# Patient Record
Sex: MALE | Race: WHITE | NOT HISPANIC OR LATINO
[De-identification: names, ages, dates, MRNs, and addresses within clinical notes are randomized per-mention and may not be internally consistent; named-entity substitution may affect disease eponyms.]

---

## 2017-04-18 ENCOUNTER — MEDICATION RENEWAL (OUTPATIENT)
Age: 82
End: 2017-04-18

## 2017-05-03 ENCOUNTER — MEDICATION RENEWAL (OUTPATIENT)
Age: 82
End: 2017-05-03

## 2017-10-09 ENCOUNTER — MEDICATION RENEWAL (OUTPATIENT)
Age: 82
End: 2017-10-09

## 2017-10-10 ENCOUNTER — MEDICATION RENEWAL (OUTPATIENT)
Age: 82
End: 2017-10-10

## 2017-11-08 ENCOUNTER — APPOINTMENT (OUTPATIENT)
Dept: UROLOGY | Facility: CLINIC | Age: 82
End: 2017-11-08
Payer: MEDICARE

## 2017-11-08 DIAGNOSIS — Z00.00 ENCOUNTER FOR GENERAL ADULT MEDICAL EXAMINATION W/OUT ABNORMAL FINDINGS: ICD-10-CM

## 2017-11-08 PROCEDURE — 99213 OFFICE O/P EST LOW 20 MIN: CPT

## 2017-11-09 LAB
APPEARANCE: CLEAR
BACTERIA: NEGATIVE
BILIRUBIN URINE: NEGATIVE
BLOOD URINE: NEGATIVE
COLOR: YELLOW
CORE LAB FLUID CYTOLOGY: NORMAL
GLUCOSE QUALITATIVE U: NEGATIVE MG/DL
KETONES URINE: NEGATIVE
LEUKOCYTE ESTERASE URINE: NEGATIVE
MICROSCOPIC-UA: NORMAL
NITRITE URINE: NEGATIVE
PH URINE: 7.5
PROTEIN URINE: NEGATIVE MG/DL
RED BLOOD CELLS URINE: 0 /HPF
SPECIFIC GRAVITY URINE: 1.01
SQUAMOUS EPITHELIAL CELLS: 0 /HPF
UROBILINOGEN URINE: NEGATIVE MG/DL
WHITE BLOOD CELLS URINE: 0 /HPF

## 2018-04-09 ENCOUNTER — RX RENEWAL (OUTPATIENT)
Age: 83
End: 2018-04-09

## 2018-10-28 ENCOUNTER — RX RENEWAL (OUTPATIENT)
Age: 83
End: 2018-10-28

## 2018-11-08 ENCOUNTER — APPOINTMENT (OUTPATIENT)
Dept: UROLOGY | Facility: CLINIC | Age: 83
End: 2018-11-08
Payer: MEDICARE

## 2018-11-08 LAB
APPEARANCE: CLEAR
BACTERIA: NEGATIVE
BILIRUBIN URINE: NEGATIVE
BLOOD URINE: NEGATIVE
COLOR: YELLOW
GLUCOSE QUALITATIVE U: NEGATIVE MG/DL
KETONES URINE: NEGATIVE
LEUKOCYTE ESTERASE URINE: NEGATIVE
MICROSCOPIC-UA: NORMAL
NITRITE URINE: NEGATIVE
PH URINE: 6.5
PROTEIN URINE: NEGATIVE MG/DL
RED BLOOD CELLS URINE: 0 /HPF
SPECIFIC GRAVITY URINE: 1.01
SQUAMOUS EPITHELIAL CELLS: 0 /HPF
UROBILINOGEN URINE: NEGATIVE MG/DL
WHITE BLOOD CELLS URINE: 0 /HPF

## 2018-11-08 PROCEDURE — 99213 OFFICE O/P EST LOW 20 MIN: CPT

## 2018-11-09 LAB — CORE LAB FLUID CYTOLOGY: NORMAL

## 2019-01-20 ENCOUNTER — RX RENEWAL (OUTPATIENT)
Age: 84
End: 2019-01-20

## 2019-04-17 ENCOUNTER — FORM ENCOUNTER (OUTPATIENT)
Age: 84
End: 2019-04-17

## 2019-04-18 ENCOUNTER — OUTPATIENT (OUTPATIENT)
Dept: OUTPATIENT SERVICES | Facility: HOSPITAL | Age: 84
LOS: 1 days | End: 2019-04-18
Payer: MEDICARE

## 2019-04-18 ENCOUNTER — APPOINTMENT (OUTPATIENT)
Dept: RADIOLOGY | Facility: IMAGING CENTER | Age: 84
End: 2019-04-18
Payer: MEDICARE

## 2019-04-18 DIAGNOSIS — Z00.8 ENCOUNTER FOR OTHER GENERAL EXAMINATION: ICD-10-CM

## 2019-04-18 PROCEDURE — 71046 X-RAY EXAM CHEST 2 VIEWS: CPT | Mod: 26

## 2019-04-18 PROCEDURE — 71046 X-RAY EXAM CHEST 2 VIEWS: CPT

## 2019-04-29 ENCOUNTER — RX RENEWAL (OUTPATIENT)
Age: 84
End: 2019-04-29

## 2019-09-18 ENCOUNTER — RX RENEWAL (OUTPATIENT)
Age: 84
End: 2019-09-18

## 2019-11-06 ENCOUNTER — RX RENEWAL (OUTPATIENT)
Age: 84
End: 2019-11-06

## 2019-11-13 ENCOUNTER — APPOINTMENT (OUTPATIENT)
Dept: UROLOGY | Facility: CLINIC | Age: 84
End: 2019-11-13
Payer: MEDICARE

## 2019-11-13 DIAGNOSIS — N40.1 BENIGN PROSTATIC HYPERPLASIA WITH LOWER URINARY TRACT SYMPMS: ICD-10-CM

## 2019-11-13 DIAGNOSIS — N13.8 BENIGN PROSTATIC HYPERPLASIA WITH LOWER URINARY TRACT SYMPMS: ICD-10-CM

## 2019-11-13 DIAGNOSIS — R35.0 FREQUENCY OF MICTURITION: ICD-10-CM

## 2019-11-13 PROCEDURE — 99214 OFFICE O/P EST MOD 30 MIN: CPT

## 2019-11-13 NOTE — PHYSICAL EXAM
[General Appearance - Well Developed] : well developed [Normal Appearance] : normal appearance [General Appearance - Well Nourished] : well nourished [Well Groomed] : well groomed [General Appearance - In No Acute Distress] : no acute distress [Abdomen Soft] : soft [Abdomen Tenderness] : non-tender [Costovertebral Angle Tenderness] : no ~M costovertebral angle tenderness [Urethral Meatus] : meatus normal [Urinary Bladder Findings] : the bladder was normal on palpation [No Prostate Nodules] : no prostate nodules [Testes Mass (___cm)] : there were no testicular masses [Scrotum] : the scrotum was normal [] : no respiratory distress [Edema] : no peripheral edema [Respiration, Rhythm And Depth] : normal respiratory rhythm and effort [Oriented To Time, Place, And Person] : oriented to person, place, and time [Exaggerated Use Of Accessory Muscles For Inspiration] : no accessory muscle use [Normal Station and Gait] : the gait and station were normal for the patient's age [Affect] : the affect was normal [Mood] : the mood was normal [Not Anxious] : not anxious [No Focal Deficits] : no focal deficits [No Palpable Adenopathy] : no palpable adenopathy

## 2019-11-14 LAB
APPEARANCE: CLEAR
BACTERIA: NEGATIVE
BILIRUBIN URINE: NEGATIVE
BLOOD URINE: NEGATIVE
COLOR: YELLOW
GLUCOSE QUALITATIVE U: NORMAL
HYALINE CASTS: 0 /LPF
KETONES URINE: NEGATIVE
LEUKOCYTE ESTERASE URINE: NEGATIVE
MICROSCOPIC-UA: NORMAL
NITRITE URINE: NEGATIVE
PH URINE: 6
PROTEIN URINE: NORMAL
RED BLOOD CELLS URINE: 1 /HPF
SPECIFIC GRAVITY URINE: 1.02
SQUAMOUS EPITHELIAL CELLS: 0 /HPF
UROBILINOGEN URINE: NORMAL
WHITE BLOOD CELLS URINE: 1 /HPF

## 2020-02-22 ENCOUNTER — RX RENEWAL (OUTPATIENT)
Age: 85
End: 2020-02-22

## 2020-05-30 ENCOUNTER — RX RENEWAL (OUTPATIENT)
Age: 85
End: 2020-05-30

## 2020-10-29 ENCOUNTER — APPOINTMENT (OUTPATIENT)
Dept: UROLOGY | Facility: CLINIC | Age: 85
End: 2020-10-29

## 2021-03-25 ENCOUNTER — APPOINTMENT (OUTPATIENT)
Dept: SURGERY | Facility: CLINIC | Age: 86
End: 2021-03-25
Payer: MEDICARE

## 2021-03-25 VITALS
HEART RATE: 79 BPM | BODY MASS INDEX: 28.61 KG/M2 | RESPIRATION RATE: 16 BRPM | SYSTOLIC BLOOD PRESSURE: 152 MMHG | TEMPERATURE: 98.1 F | OXYGEN SATURATION: 98 % | WEIGHT: 178 LBS | DIASTOLIC BLOOD PRESSURE: 77 MMHG | HEIGHT: 66 IN

## 2021-03-25 DIAGNOSIS — Z78.9 OTHER SPECIFIED HEALTH STATUS: ICD-10-CM

## 2021-03-25 DIAGNOSIS — Z90.49 ACQUIRED ABSENCE OF OTHER SPECIFIED PARTS OF DIGESTIVE TRACT: ICD-10-CM

## 2021-03-25 PROCEDURE — 99204 OFFICE O/P NEW MOD 45 MIN: CPT

## 2021-03-25 RX ORDER — ALLOPURINOL 500 MG/25ML
INJECTION, POWDER, LYOPHILIZED, FOR SOLUTION INTRAVENOUS
Refills: 0 | Status: ACTIVE | COMMUNITY

## 2021-03-25 RX ORDER — BISOPROLOL FUMARATE AND HYDROCHLOROTHIAZIDE 5; 6.25 MG/1; MG/1
5-6.25 TABLET, FILM COATED ORAL
Refills: 0 | Status: ACTIVE | COMMUNITY

## 2021-03-25 NOTE — PHYSICAL EXAM
[No Rash or Lesion] : No rash or lesion [Calm] : calm [de-identified] : No acute distress [de-identified] : Sclera clear [de-identified] : Supple [de-identified] : Obese soft and nontender.  There is a reducible right inguinal hernia.  No hernias are palpated on the left [de-identified] : No clubbing cyanosis or edema [de-identified] : Cranial nerves II through XII grossly intact

## 2021-03-25 NOTE — ASSESSMENT
[FreeTextEntry1] : This patient is suffering from a right inguinal hernia.  I have offered him repair of this fascial defect.  The procedure as well as risks(including, but not limited to bleeding, infection, injury to hollow viscus, chronic pain, testicular atrophy), benefits (pain relief), and alternatives were explained to the patient.

## 2021-03-25 NOTE — CONSULT LETTER
[Dear  ___] : Dear  [unfilled], [Consult Letter:] : I had the pleasure of evaluating your patient, [unfilled]. [Please see my note below.] : Please see my note below. [Consult Closing:] : Thank you very much for allowing me to participate in the care of this patient.  If you have any questions, please do not hesitate to contact me. [Sincerely,] : Sincerely, [FreeTextEntry3] : Dhiraj Rosales MD, FACS\par [facility]\par [facility]\par [address]\par [place]  [address]\par Tel: [phone]\par Email: [email or web address] \par

## 2021-03-25 NOTE — HISTORY OF PRESENT ILLNESS
[de-identified] : This 86-year-old patient tells me that his PCP, Dr. Campoverde, found a right inguinal hernia on examination.  The patient was unaware of the presence of the hernia.  He denies any pain in the right groin or right abdomen.  He denies the presence of a visible or palpable bulge.  He denies any change in bowel or urinary habits.

## 2021-04-06 ENCOUNTER — OUTPATIENT (OUTPATIENT)
Dept: OUTPATIENT SERVICES | Facility: HOSPITAL | Age: 86
LOS: 1 days | End: 2021-04-06
Payer: MEDICARE

## 2021-04-06 VITALS
HEART RATE: 71 BPM | DIASTOLIC BLOOD PRESSURE: 72 MMHG | HEIGHT: 63.75 IN | RESPIRATION RATE: 18 BRPM | SYSTOLIC BLOOD PRESSURE: 143 MMHG | OXYGEN SATURATION: 96 % | WEIGHT: 179.46 LBS | TEMPERATURE: 97 F

## 2021-04-06 DIAGNOSIS — I10 ESSENTIAL (PRIMARY) HYPERTENSION: ICD-10-CM

## 2021-04-06 DIAGNOSIS — D69.6 THROMBOCYTOPENIA, UNSPECIFIED: ICD-10-CM

## 2021-04-06 DIAGNOSIS — Z98.890 OTHER SPECIFIED POSTPROCEDURAL STATES: Chronic | ICD-10-CM

## 2021-04-06 DIAGNOSIS — Z90.49 ACQUIRED ABSENCE OF OTHER SPECIFIED PARTS OF DIGESTIVE TRACT: Chronic | ICD-10-CM

## 2021-04-06 DIAGNOSIS — K40.90 UNILATERAL INGUINAL HERNIA, WITHOUT OBSTRUCTION OR GANGRENE, NOT SPECIFIED AS RECURRENT: ICD-10-CM

## 2021-04-06 DIAGNOSIS — N40.0 BENIGN PROSTATIC HYPERPLASIA WITHOUT LOWER URINARY TRACT SYMPTOMS: ICD-10-CM

## 2021-04-06 DIAGNOSIS — Z96.651 PRESENCE OF RIGHT ARTIFICIAL KNEE JOINT: Chronic | ICD-10-CM

## 2021-04-06 DIAGNOSIS — Z01.818 ENCOUNTER FOR OTHER PREPROCEDURAL EXAMINATION: ICD-10-CM

## 2021-04-06 LAB
HCT VFR BLD CALC: 44.4 % — SIGNIFICANT CHANGE UP (ref 39–50)
HGB BLD-MCNC: 15.5 G/DL — SIGNIFICANT CHANGE UP (ref 13–17)
MCHC RBC-ENTMCNC: 32.4 PG — SIGNIFICANT CHANGE UP (ref 27–34)
MCHC RBC-ENTMCNC: 34.9 GM/DL — SIGNIFICANT CHANGE UP (ref 32–36)
MCV RBC AUTO: 92.7 FL — SIGNIFICANT CHANGE UP (ref 80–100)
NRBC # BLD: 0 /100 WBCS — SIGNIFICANT CHANGE UP (ref 0–0)
PLATELET # BLD AUTO: 146 K/UL — LOW (ref 150–400)
RBC # BLD: 4.79 M/UL — SIGNIFICANT CHANGE UP (ref 4.2–5.8)
RBC # FLD: 12.7 % — SIGNIFICANT CHANGE UP (ref 10.3–14.5)
SARS-COV-2 RNA SPEC QL NAA+PROBE: SIGNIFICANT CHANGE UP
WBC # BLD: 7.26 K/UL — SIGNIFICANT CHANGE UP (ref 3.8–10.5)
WBC # FLD AUTO: 7.26 K/UL — SIGNIFICANT CHANGE UP (ref 3.8–10.5)

## 2021-04-06 PROCEDURE — 85027 COMPLETE CBC AUTOMATED: CPT

## 2021-04-06 PROCEDURE — 36415 COLL VENOUS BLD VENIPUNCTURE: CPT

## 2021-04-06 PROCEDURE — U0005: CPT

## 2021-04-06 PROCEDURE — U0003: CPT

## 2021-04-06 PROCEDURE — G0463: CPT

## 2021-04-06 NOTE — H&P PST ADULT - NSICDXPROBLEM_GEN_ALL_CORE_FT
PROBLEM DIAGNOSES  Problem: Hypertension  Assessment and Plan: Takes medications as prescribed     Problem: BPH (benign prostatic hyperplasia)  Assessment and Plan: Takes medications as prescribed     Problem: Thrombocytopenia  Assessment and Plan: CBC pending - Medically cleared if platlets greater than     Problem: Unilateral inguinal hernia, without obstruction or gangrene, not specified as recurrent  Assessment and Plan: Sceduled for repair right inguinal hernia with Dr Sullivan on 04/09/2021.  CBC pending.  Medical clearance, BMP, EKG on chart.  Pre op instructions given and patient verbalized understanding.  COVID-19 pending.  JERRY precautions.  Hair removal AM or procedure

## 2021-04-06 NOTE — H&P PST ADULT - NSICDXFAMILYHX_GEN_ALL_CORE_FT
FAMILY HISTORY:  Father  Still living? Unknown  FH: liver disease, Age at diagnosis: Age Unknown    Sibling  Still living? Unknown  FH: diabetes mellitus, Age at diagnosis: Age Unknown  FH: liver disease, Age at diagnosis: Age Unknown

## 2021-04-06 NOTE — H&P PST ADULT - NSANTHOSAYNRD_GEN_A_CORE
neck 16.5 inches/No. JERRY screening performed.  STOP BANG Legend: 0-2 = LOW Risk; 3-4 = INTERMEDIATE Risk; 5-8 = HIGH Risk

## 2021-04-06 NOTE — H&P PST ADULT - NSICDXPASTSURGICALHX_GEN_ALL_CORE_FT
PAST SURGICAL HISTORY:  H/O colonoscopy     S/P appendectomy 1954    Status post right partial knee replacement 2016

## 2021-04-06 NOTE — H&P PST ADULT - NSICDXPASTMEDICALHX_GEN_ALL_CORE_FT
PAST MEDICAL HISTORY:  Gout     History of BPH     Hypertension     MGUS (monoclonal gammopathy of unknown significance)     Retinal tear 2000 approx     PAST MEDICAL HISTORY:  Gout     History of BPH     Hypertension     MGUS (monoclonal gammopathy of unknown significance)     Retinal tear 2000 approx    Thrombocytopenia

## 2021-04-08 ENCOUNTER — TRANSCRIPTION ENCOUNTER (OUTPATIENT)
Age: 86
End: 2021-04-08

## 2021-04-09 ENCOUNTER — APPOINTMENT (OUTPATIENT)
Dept: SURGERY | Facility: HOSPITAL | Age: 86
End: 2021-04-09
Payer: MEDICARE

## 2021-04-09 ENCOUNTER — OUTPATIENT (OUTPATIENT)
Dept: OUTPATIENT SERVICES | Facility: HOSPITAL | Age: 86
LOS: 1 days | End: 2021-04-09
Payer: MEDICARE

## 2021-04-09 ENCOUNTER — RESULT REVIEW (OUTPATIENT)
Age: 86
End: 2021-04-09

## 2021-04-09 VITALS
SYSTOLIC BLOOD PRESSURE: 142 MMHG | TEMPERATURE: 98 F | OXYGEN SATURATION: 97 % | HEART RATE: 66 BPM | DIASTOLIC BLOOD PRESSURE: 70 MMHG | RESPIRATION RATE: 16 BRPM

## 2021-04-09 VITALS
RESPIRATION RATE: 16 BRPM | HEART RATE: 70 BPM | WEIGHT: 179.46 LBS | OXYGEN SATURATION: 97 % | DIASTOLIC BLOOD PRESSURE: 84 MMHG | TEMPERATURE: 98 F | HEIGHT: 63.75 IN | SYSTOLIC BLOOD PRESSURE: 150 MMHG

## 2021-04-09 DIAGNOSIS — Z96.651 PRESENCE OF RIGHT ARTIFICIAL KNEE JOINT: Chronic | ICD-10-CM

## 2021-04-09 DIAGNOSIS — Z98.890 OTHER SPECIFIED POSTPROCEDURAL STATES: Chronic | ICD-10-CM

## 2021-04-09 DIAGNOSIS — K40.90 UNILATERAL INGUINAL HERNIA, WITHOUT OBSTRUCTION OR GANGRENE, NOT SPECIFIED AS RECURRENT: ICD-10-CM

## 2021-04-09 DIAGNOSIS — Z90.49 ACQUIRED ABSENCE OF OTHER SPECIFIED PARTS OF DIGESTIVE TRACT: Chronic | ICD-10-CM

## 2021-04-09 PROCEDURE — 49505 PRP I/HERN INIT REDUC >5 YR: CPT | Mod: RT

## 2021-04-09 PROCEDURE — 88304 TISSUE EXAM BY PATHOLOGIST: CPT | Mod: 26

## 2021-04-09 PROCEDURE — C1781: CPT

## 2021-04-09 PROCEDURE — 88304 TISSUE EXAM BY PATHOLOGIST: CPT

## 2021-04-09 PROCEDURE — 49505 PRP I/HERN INIT REDUC >5 YR: CPT | Mod: AS,RT

## 2021-04-09 RX ORDER — ONDANSETRON 8 MG/1
4 TABLET, FILM COATED ORAL ONCE
Refills: 0 | Status: DISCONTINUED | OUTPATIENT
Start: 2021-04-09 | End: 2021-04-09

## 2021-04-09 RX ORDER — SODIUM CHLORIDE 9 MG/ML
1000 INJECTION, SOLUTION INTRAVENOUS
Refills: 0 | Status: DISCONTINUED | OUTPATIENT
Start: 2021-04-09 | End: 2021-04-09

## 2021-04-09 RX ORDER — OXYCODONE HYDROCHLORIDE 5 MG/1
5 TABLET ORAL ONCE
Refills: 0 | Status: DISCONTINUED | OUTPATIENT
Start: 2021-04-09 | End: 2021-04-09

## 2021-04-09 RX ORDER — OXYCODONE HYDROCHLORIDE 5 MG/1
1 TABLET ORAL
Qty: 12 | Refills: 0
Start: 2021-04-09 | End: 2021-04-11

## 2021-04-09 RX ORDER — HYDROMORPHONE HYDROCHLORIDE 2 MG/ML
0.5 INJECTION INTRAMUSCULAR; INTRAVENOUS; SUBCUTANEOUS
Refills: 0 | Status: DISCONTINUED | OUTPATIENT
Start: 2021-04-09 | End: 2021-04-09

## 2021-04-09 RX ADMIN — SODIUM CHLORIDE 30 MILLILITER(S): 9 INJECTION, SOLUTION INTRAVENOUS at 07:08

## 2021-04-09 NOTE — BRIEF OPERATIVE NOTE - NSICDXBRIEFPROCEDURE_GEN_ALL_CORE_FT
PROCEDURES:  Repair, hernia, inguinal, open, using mesh, adult 09-Apr-2021 10:44:05 Direct  inguinal hernia Wendy Connolly

## 2021-04-09 NOTE — ASU DISCHARGE PLAN (ADULT/PEDIATRIC) - ASU DC SPECIAL INSTRUCTIONSFT
See instruction sheet for post op instructions   ice pack to right groin for comfort   Take pain medication as directed -  take oxycodone for moderate to severe pain   take tylenol for mild pain

## 2021-04-09 NOTE — ASU DISCHARGE PLAN (ADULT/PEDIATRIC) - CARE PROVIDER_API CALL
Dhiraj Sullivan)  Surgery  [address], [address]  [place] [address]  Phone: [phone]  Fax: [phone]  Follow Up Time: 2 weeks

## 2021-04-09 NOTE — ASU PATIENT PROFILE, ADULT - PMH
Gout    History of BPH    Hypertension    MGUS (monoclonal gammopathy of unknown significance)    Retinal tear  2000 approx  Thrombocytopenia

## 2021-04-09 NOTE — ASU PATIENT PROFILE, ADULT - ABILITY TO HEAR (WITH HEARING AID OR HEARING APPLIANCE IF NORMALLY USED):
Adequate: hears normal conversation without difficulty bilateral hearing aides/Mildly to Moderately Impaired: difficulty hearing in some environments or speaker may need to increase volume or speak distinctly

## 2021-04-09 NOTE — ASU DISCHARGE PLAN (ADULT/PEDIATRIC) - CALL YOUR DOCTOR IF YOU HAVE ANY OF THE FOLLOWING:
Bleeding that does not stop/Swelling that gets worse/Pain not relieved by Medications/Fever greater than (need to indicate Fahrenheit or Celsius) Bleeding that does not stop/Swelling that gets worse/Pain not relieved by Medications/Fever greater than (need to indicate Fahrenheit or Celsius)/Nausea and vomiting that does not stop

## 2021-04-12 PROBLEM — H33.319: Chronic | Status: ACTIVE | Noted: 2021-04-06

## 2021-04-12 PROBLEM — D47.2 MONOCLONAL GAMMOPATHY: Chronic | Status: ACTIVE | Noted: 2021-04-06

## 2021-04-12 PROBLEM — I10 ESSENTIAL (PRIMARY) HYPERTENSION: Chronic | Status: ACTIVE | Noted: 2021-04-06

## 2021-04-12 PROBLEM — M10.9 GOUT, UNSPECIFIED: Chronic | Status: ACTIVE | Noted: 2021-04-06

## 2021-04-12 PROBLEM — D69.6 THROMBOCYTOPENIA, UNSPECIFIED: Chronic | Status: ACTIVE | Noted: 2021-04-06

## 2021-04-12 PROBLEM — Z87.438 PERSONAL HISTORY OF OTHER DISEASES OF MALE GENITAL ORGANS: Chronic | Status: ACTIVE | Noted: 2021-04-06

## 2021-04-15 LAB — SURGICAL PATHOLOGY STUDY: SIGNIFICANT CHANGE UP

## 2021-04-22 ENCOUNTER — APPOINTMENT (OUTPATIENT)
Dept: SURGERY | Facility: CLINIC | Age: 86
End: 2021-04-22
Payer: MEDICARE

## 2021-04-22 VITALS
TEMPERATURE: 97.1 F | RESPIRATION RATE: 18 BRPM | HEART RATE: 78 BPM | WEIGHT: 178 LBS | SYSTOLIC BLOOD PRESSURE: 157 MMHG | OXYGEN SATURATION: 98 % | BODY MASS INDEX: 28.61 KG/M2 | DIASTOLIC BLOOD PRESSURE: 78 MMHG | HEIGHT: 66 IN

## 2021-04-22 DIAGNOSIS — K40.90 UNILATERAL INGUINAL HERNIA, W/OUT OBSTRUCTION OR GANGRENE, NOT SPECIFIED AS RECURRENT: ICD-10-CM

## 2021-04-22 PROCEDURE — 99024 POSTOP FOLLOW-UP VISIT: CPT

## 2021-04-22 NOTE — REASON FOR VISIT
[Post Op: _________] : a [unfilled] post op visit [FreeTextEntry1] : Repair of right inguinal hernia with mesh.

## 2021-08-05 ENCOUNTER — RX RENEWAL (OUTPATIENT)
Age: 86
End: 2021-08-05

## 2021-10-22 ENCOUNTER — TRANSCRIPTION ENCOUNTER (OUTPATIENT)
Age: 86
End: 2021-10-22

## 2021-11-06 ENCOUNTER — TRANSCRIPTION ENCOUNTER (OUTPATIENT)
Age: 86
End: 2021-11-06

## 2021-11-06 ENCOUNTER — EMERGENCY (EMERGENCY)
Facility: HOSPITAL | Age: 86
LOS: 1 days | Discharge: HOME | End: 2021-11-06
Attending: EMERGENCY MEDICINE
Payer: MEDICARE

## 2021-11-06 VITALS
SYSTOLIC BLOOD PRESSURE: 108 MMHG | DIASTOLIC BLOOD PRESSURE: 65 MMHG | OXYGEN SATURATION: 98 % | RESPIRATION RATE: 18 BRPM | HEIGHT: 63.75 IN | WEIGHT: 182.98 LBS | HEART RATE: 84 BPM | TEMPERATURE: 98 F

## 2021-11-06 DIAGNOSIS — Z98.890 OTHER SPECIFIED POSTPROCEDURAL STATES: Chronic | ICD-10-CM

## 2021-11-06 DIAGNOSIS — Z90.49 ACQUIRED ABSENCE OF OTHER SPECIFIED PARTS OF DIGESTIVE TRACT: Chronic | ICD-10-CM

## 2021-11-06 DIAGNOSIS — Z96.651 PRESENCE OF RIGHT ARTIFICIAL KNEE JOINT: Chronic | ICD-10-CM

## 2021-11-06 LAB
ALBUMIN SERPL ELPH-MCNC: 4.5 G/DL — SIGNIFICANT CHANGE UP (ref 3.3–5)
ALP SERPL-CCNC: 65 U/L — SIGNIFICANT CHANGE UP (ref 40–120)
ALT FLD-CCNC: 22 U/L — SIGNIFICANT CHANGE UP (ref 10–45)
ANION GAP SERPL CALC-SCNC: 13 MMOL/L — SIGNIFICANT CHANGE UP (ref 5–17)
APTT BLD: 31.4 SEC — SIGNIFICANT CHANGE UP (ref 27.5–35.5)
AST SERPL-CCNC: 24 U/L — SIGNIFICANT CHANGE UP (ref 10–40)
BASE EXCESS BLDV CALC-SCNC: 1.7 MMOL/L — SIGNIFICANT CHANGE UP (ref -2–2)
BASE EXCESS BLDV CALC-SCNC: 2.2 MMOL/L — HIGH (ref -2–2)
BASOPHILS # BLD AUTO: 0.04 K/UL — SIGNIFICANT CHANGE UP (ref 0–0.2)
BASOPHILS NFR BLD AUTO: 0.5 % — SIGNIFICANT CHANGE UP (ref 0–2)
BILIRUB SERPL-MCNC: 1.5 MG/DL — HIGH (ref 0.2–1.2)
BUN SERPL-MCNC: 13 MG/DL — SIGNIFICANT CHANGE UP (ref 7–23)
CA-I SERPL-SCNC: 1.29 MMOL/L — SIGNIFICANT CHANGE UP (ref 1.15–1.33)
CA-I SERPL-SCNC: 1.32 MMOL/L — SIGNIFICANT CHANGE UP (ref 1.15–1.33)
CALCIUM SERPL-MCNC: 10.2 MG/DL — SIGNIFICANT CHANGE UP (ref 8.4–10.5)
CHLORIDE BLDV-SCNC: 100 MMOL/L — SIGNIFICANT CHANGE UP (ref 96–108)
CHLORIDE BLDV-SCNC: 102 MMOL/L — SIGNIFICANT CHANGE UP (ref 96–108)
CHLORIDE SERPL-SCNC: 99 MMOL/L — SIGNIFICANT CHANGE UP (ref 96–108)
CO2 BLDV-SCNC: 29 MMOL/L — HIGH (ref 22–26)
CO2 BLDV-SCNC: 32 MMOL/L — HIGH (ref 22–26)
CO2 SERPL-SCNC: 24 MMOL/L — SIGNIFICANT CHANGE UP (ref 22–31)
CREAT SERPL-MCNC: 0.83 MG/DL — SIGNIFICANT CHANGE UP (ref 0.5–1.3)
EOSINOPHIL # BLD AUTO: 0.1 K/UL — SIGNIFICANT CHANGE UP (ref 0–0.5)
EOSINOPHIL NFR BLD AUTO: 1.3 % — SIGNIFICANT CHANGE UP (ref 0–6)
GAS PNL BLDV: 135 MMOL/L — LOW (ref 136–145)
GAS PNL BLDV: 135 MMOL/L — LOW (ref 136–145)
GAS PNL BLDV: SIGNIFICANT CHANGE UP
GLUCOSE BLDV-MCNC: 102 MG/DL — HIGH (ref 70–99)
GLUCOSE BLDV-MCNC: 108 MG/DL — HIGH (ref 70–99)
GLUCOSE SERPL-MCNC: 104 MG/DL — HIGH (ref 70–99)
HCO3 BLDV-SCNC: 27 MMOL/L — SIGNIFICANT CHANGE UP (ref 22–29)
HCO3 BLDV-SCNC: 30 MMOL/L — HIGH (ref 22–29)
HCT VFR BLD CALC: 44 % — SIGNIFICANT CHANGE UP (ref 39–50)
HCT VFR BLDA CALC: 44 % — SIGNIFICANT CHANGE UP (ref 39–51)
HCT VFR BLDA CALC: 46 % — SIGNIFICANT CHANGE UP (ref 39–51)
HGB BLD CALC-MCNC: 14.7 G/DL — SIGNIFICANT CHANGE UP (ref 12.6–17.4)
HGB BLD CALC-MCNC: 15.3 G/DL — SIGNIFICANT CHANGE UP (ref 12.6–17.4)
HGB BLD-MCNC: 14.9 G/DL — SIGNIFICANT CHANGE UP (ref 13–17)
IMM GRANULOCYTES NFR BLD AUTO: 0.4 % — SIGNIFICANT CHANGE UP (ref 0–1.5)
INR BLD: 1.07 RATIO — SIGNIFICANT CHANGE UP (ref 0.88–1.16)
LACTATE BLDV-MCNC: 1.7 MMOL/L — SIGNIFICANT CHANGE UP (ref 0.7–2)
LACTATE BLDV-MCNC: 2.8 MMOL/L — HIGH (ref 0.7–2)
LYMPHOCYTES # BLD AUTO: 3.25 K/UL — SIGNIFICANT CHANGE UP (ref 1–3.3)
LYMPHOCYTES # BLD AUTO: 41.7 % — SIGNIFICANT CHANGE UP (ref 13–44)
MCHC RBC-ENTMCNC: 32.7 PG — SIGNIFICANT CHANGE UP (ref 27–34)
MCHC RBC-ENTMCNC: 33.9 GM/DL — SIGNIFICANT CHANGE UP (ref 32–36)
MCV RBC AUTO: 96.7 FL — SIGNIFICANT CHANGE UP (ref 80–100)
MONOCYTES # BLD AUTO: 0.55 K/UL — SIGNIFICANT CHANGE UP (ref 0–0.9)
MONOCYTES NFR BLD AUTO: 7.1 % — SIGNIFICANT CHANGE UP (ref 2–14)
NEUTROPHILS # BLD AUTO: 3.83 K/UL — SIGNIFICANT CHANGE UP (ref 1.8–7.4)
NEUTROPHILS NFR BLD AUTO: 49 % — SIGNIFICANT CHANGE UP (ref 43–77)
NRBC # BLD: 0 /100 WBCS — SIGNIFICANT CHANGE UP (ref 0–0)
PCO2 BLDV: 45 MMHG — SIGNIFICANT CHANGE UP (ref 42–55)
PCO2 BLDV: 62 MMHG — HIGH (ref 42–55)
PH BLDV: 7.3 — LOW (ref 7.32–7.43)
PH BLDV: 7.39 — SIGNIFICANT CHANGE UP (ref 7.32–7.43)
PLATELET # BLD AUTO: 143 K/UL — LOW (ref 150–400)
PO2 BLDV: 27 MMHG — SIGNIFICANT CHANGE UP (ref 25–45)
PO2 BLDV: 45 MMHG — SIGNIFICANT CHANGE UP (ref 25–45)
POTASSIUM BLDV-SCNC: 4.2 MMOL/L — SIGNIFICANT CHANGE UP (ref 3.5–5.1)
POTASSIUM BLDV-SCNC: 4.4 MMOL/L — SIGNIFICANT CHANGE UP (ref 3.5–5.1)
POTASSIUM SERPL-MCNC: 4.4 MMOL/L — SIGNIFICANT CHANGE UP (ref 3.5–5.3)
POTASSIUM SERPL-SCNC: 4.4 MMOL/L — SIGNIFICANT CHANGE UP (ref 3.5–5.3)
PROT SERPL-MCNC: 7.1 G/DL — SIGNIFICANT CHANGE UP (ref 6–8.3)
PROTHROM AB SERPL-ACNC: 12.8 SEC — SIGNIFICANT CHANGE UP (ref 10.6–13.6)
RBC # BLD: 4.55 M/UL — SIGNIFICANT CHANGE UP (ref 4.2–5.8)
RBC # FLD: 12.7 % — SIGNIFICANT CHANGE UP (ref 10.3–14.5)
SAO2 % BLDV: 40.1 % — LOW (ref 67–88)
SAO2 % BLDV: 76 % — SIGNIFICANT CHANGE UP (ref 67–88)
SARS-COV-2 RNA SPEC QL NAA+PROBE: SIGNIFICANT CHANGE UP
SODIUM SERPL-SCNC: 136 MMOL/L — SIGNIFICANT CHANGE UP (ref 135–145)
TROPONIN T, HIGH SENSITIVITY RESULT: 18 NG/L — SIGNIFICANT CHANGE UP (ref 0–51)
TROPONIN T, HIGH SENSITIVITY RESULT: 18 NG/L — SIGNIFICANT CHANGE UP (ref 0–51)
WBC # BLD: 7.8 K/UL — SIGNIFICANT CHANGE UP (ref 3.8–10.5)
WBC # FLD AUTO: 7.8 K/UL — SIGNIFICANT CHANGE UP (ref 3.8–10.5)

## 2021-11-06 PROCEDURE — 99291 CRITICAL CARE FIRST HOUR: CPT

## 2021-11-06 PROCEDURE — 70498 CT ANGIOGRAPHY NECK: CPT | Mod: 26,MA

## 2021-11-06 PROCEDURE — 70496 CT ANGIOGRAPHY HEAD: CPT | Mod: 26,MA

## 2021-11-06 PROCEDURE — 93010 ELECTROCARDIOGRAM REPORT: CPT

## 2021-11-06 RX ORDER — ALLOPURINOL 300 MG
200 TABLET ORAL DAILY
Refills: 0 | Status: DISCONTINUED | OUTPATIENT
Start: 2021-11-06 | End: 2021-11-07

## 2021-11-06 RX ORDER — ASCORBIC ACID 60 MG
1 TABLET,CHEWABLE ORAL
Qty: 0 | Refills: 0 | DISCHARGE

## 2021-11-06 RX ORDER — CHOLECALCIFEROL (VITAMIN D3) 125 MCG
1 CAPSULE ORAL
Qty: 0 | Refills: 0 | DISCHARGE

## 2021-11-06 RX ORDER — RAMIPRIL 5 MG
1 CAPSULE ORAL
Qty: 0 | Refills: 0 | DISCHARGE

## 2021-11-06 RX ORDER — SODIUM CHLORIDE 9 MG/ML
1000 INJECTION, SOLUTION INTRAVENOUS ONCE
Refills: 0 | Status: COMPLETED | OUTPATIENT
Start: 2021-11-06 | End: 2021-11-06

## 2021-11-06 RX ORDER — FINASTERIDE 5 MG/1
1 TABLET, FILM COATED ORAL
Qty: 0 | Refills: 0 | DISCHARGE

## 2021-11-06 RX ORDER — BISOPROLOL FUMARATE 10 MG/1
1 TABLET, FILM COATED ORAL
Qty: 0 | Refills: 0 | DISCHARGE

## 2021-11-06 RX ORDER — ALLOPURINOL 300 MG
2 TABLET ORAL
Qty: 0 | Refills: 0 | DISCHARGE

## 2021-11-06 RX ORDER — FINASTERIDE 5 MG/1
5 TABLET, FILM COATED ORAL AT BEDTIME
Refills: 0 | Status: DISCONTINUED | OUTPATIENT
Start: 2021-11-06 | End: 2021-11-07

## 2021-11-06 RX ADMIN — SODIUM CHLORIDE 1000 MILLILITER(S): 9 INJECTION, SOLUTION INTRAVENOUS at 18:01

## 2021-11-06 RX ADMIN — SODIUM CHLORIDE 1000 MILLILITER(S): 9 INJECTION, SOLUTION INTRAVENOUS at 19:45

## 2021-11-06 NOTE — CONSULT NOTE ADULT - ASSESSMENT
LKW:  NIHSS:    Baseline MRS:  Not a tPA candidate due to   Not a thrombectomy candidate due to absent large vessel occlusion/ or based on NIHSS    CT head showed:     Impression: Patient with     Mechanism: unknown    Recommendations: INCOMPLETE  [] HgbA1C, fasting lipid panel, CBC, CMP, coag panel, troponin  [] CDU for MRI brain w/o con,      [] telemetry to check for arrhythmia, EKG, while here  [] Neuro-checks with vitals q4h while here   [] DVT ppx if admitted    Discussed with stroke fellow Dr Milton Elmore and under supervision of attending Dr Richard Libman regarding decision against candidacy for tPA/ thrombectomy. Will be formally staffed on morning rounds with attending. Recommendations will be complete once signed by attending. Patient is a 86 y/o M with PMHx MGUS, mild thrombocytopenia, HTN and BPH, not on antiplatelet/anticoagulant agent, who presents to ED for RUE numbness that occurred intermittently throughout the day and was resolved by arrival to the ED. Neuro exam including that of RUE is unremarkable for motor or sensory findings when seen.    LKW: 11PM 11/5/21  NIHSS: 0    Baseline MRS: 0   Not a tPA candidate due to outside time window  Not a thrombectomy candidate based on NIHSS and absence of large vessel occlusion    CT head:   1.  No acute intracranial hemorrhage, transcortical infarct, loss of gray-white junction or hyperdense intravascular thrombus.  2.  No extra-axial collection, hydrocephalus, midline shift or space-occupying mass lesion    CTA head/neck: pending    Impression: Patient with one day of intermittent RUE numbness that resolved on ED presentation that can be localized to L hemispheric dysfunction. Can consider R brachial involvement as well. Given resolution of symptoms and if CT head and CTA are unremarkable, can have patient follow up outpatient with MRI head w/o contrast.     Mechanism: unknown    Recommendations:   [] HgbA1C, fasting lipid panel, CBC, CMP, coag panel, troponin  [] *If CT head and CTA head/ neck are unremarkable, can have patient follow up for MRI w/o outpatient and [address] [place] [address] for stroke neurology follow up. If both are unremarkable, consider starting aspirin 81mg and plavix 75mg (no load) and have follow up for MRI.      [] telemetry to check for arrhythmia, EKG, while here  [] Neuro-checks with vitals q4h while here   [] DVT ppx if admitted    Discussed with stroke fellow Dr Milton Elmore and under supervision of attending Dr Richard Libman regarding decision against candidacy for tPA/ thrombectomy. Will be formally staffed on morning rounds with attending. Recommendations will be complete once signed by attending. Patient is a 88 y/o M with PMHx MGUS, mild thrombocytopenia, HTN and BPH, not on antiplatelet/anticoagulant agent, who presents to ED for RUE numbness that occurred intermittently throughout the day and was resolved by arrival to the ED. Neuro exam including that of RUE is unremarkable for motor or sensory findings when seen.     LKW: 11PM 11/5/21  NIHSS: 0    Baseline MRS: 0   Not a tPA candidate due to outside time window  Not a thrombectomy candidate based on NIHSS and absence of large vessel occlusion    CT head: No acute intracranial hemorrhage, transcortical infarct, loss of gray-white junction or hyperdense intravascular thrombus. No extra-axial collection, hydrocephalus, midline shift or space-occupying mass lesion    CTA head/neck: Negative CTA of head and neck however, reversal of the usual cervical lordosis with minimal anterolisthesis of C2 on C3, C3 on C4 and C4 and C5. No acute fractures; mild chronic compression deformities of C4-C6. Mild disc osteophyte complexes at C5-C6 and C6-C7 along with moderate degenerative disc disease. Moderate to severe multilevel neural foraminal narrowing.     Impression: Patient with one day of intermittent RUE numbness that resolved on ED presentation that can be localized to L hemispheric dysfunction. Can consider nerve root compression on differential as CT of neck suggested mod/severe multilevel neural foraminal narrowing.    Recommendations:   [] HgbA1C, fasting lipid panel, CBC, CMP, coag panel, troponin  [] MRI brain w/o contrast, MRI c spine w/ w/o contrast while in CDU  [] Will determine starting aspirin 81mg and/or aspirin 81mg with plavix 75mg after obtaining MRI results  [] If signs of nerve root compression on MR c spine imaging, consider neurosurgical evaluation  [] telemetry to check for arrhythmia, EKG, while here  [] Neuro-checks with vitals q4h while here   [] DVT ppx if admitted    Discussed with stroke fellow Dr Milton Elmore and under supervision of attending Dr Richard Libman regarding decision against candidacy for tPA/ thrombectomy. Will be formally staffed on morning rounds with attending. Recommendations will be complete once signed by attending.

## 2021-11-06 NOTE — ED CDU PROVIDER INITIAL DAY NOTE - DETAILS
Numbness  -MRI  -Neuro following  -Tele/Neuro checks  -TOO [place], vitals every 4 hours, frequent reevaluations

## 2021-11-06 NOTE — ED CDU PROVIDER INITIAL DAY NOTE - PHYSICAL EXAMINATION
GEN: Well Appearing, Nontoxic, NAD  HEENT: NC/AT, Symm Facies. PERRL, EOMI  CV: No JVD/Bruits or stridor;  +S1S2, RRR w/o m/g/r  RESP: CTAB w/o w/r/r  ABD: Soft, nt/nd, +BS. No guarding/rebound.   EXT/MSK: No lower extremity edema or calf tenderness FROMx4  PSYCH: Appropriate mood and affect   Neuro: Grossly intact, AOX3 with normal speech,  Sensation grossly intact, motor 5/5 throughout

## 2021-11-06 NOTE — CONSULT NOTE ADULT - ATTENDING COMMENTS
code stroke called in ED and neurology emergently assessed patient. Briefly   88 y/o M with PMHx MGUS, mild thrombocytopenia, HTN and BPH, not on antiplatelet/anticoagulant agent, who presents to ED for RUE numbness that occurred intermittently throughout the day and was resolved by arrival to the ED     LKW: 11PM 11/5/21  NIHSS: 0    Baseline MRS: 0   Not a tPA candidate due to outside time window  Not a thrombectomy candidate based on NIHSS and absence of large vessel occlusion    CT head: No acute intracranial hemorrhage, transcortical infarct, loss of gray-white junction or hyperdense intravascular thrombus. No extra-axial collection, hydrocephalus, midline shift or space-occupying mass lesion    CTA head/neck: Negative CTA of head and neck however, reversal of the usual cervical lordosis with minimal anterolisthesis of C2 on C3, C3 on C4 and C4 and C5. No acute fractures; mild chronic compression deformities of C4-C6. Mild disc osteophyte complexes at C5-C6 and C6-C7 along with moderate degenerative disc disease. Moderate to severe multilevel neural foraminal narrowing.   LDL 76     Impression: Patient with one day of intermittent RUE numbness that resolved on ED presentation that can be localized to L hemispheric dysfunction. Can consider nerve root compression on differential as CT of neck suggested mod/severe multilevel neural foraminal narrowing.    - f/u MRI brain and C spine.   - if MRI + stroke start ASA 81 and lipitor 40 otherwise can likely f/u outpt if unreamrakble for EMG/NCS.  spoke with CDU team.     Calin Jolly MD  Vascular Neurology  Office: [phone]

## 2021-11-06 NOTE — ED ADULT NURSE REASSESSMENT NOTE - NS ED NURSE REASSESS COMMENT FT1
Pt ambulated to bathroom. Steady gait, reports no dizziness, lightheadedness, or numbness upon ambulation.

## 2021-11-06 NOTE — ED CDU PROVIDER INITIAL DAY NOTE - OBJECTIVE STATEMENT
89y/o M h/o MGUS HTN, BPH, Gout presenting with numbness in the right arm; from shoulder to hand; states noted numbness upon awakening this morning around 5AM; however, has now significantly improved and just is a little tingling in the right palm. No weakness.  No fever/chills. No nausea/vomiting. Denies chest pain, SOB. Denies fall/trauma.  Denies ASA/antiplatelet/ anticoagulant use.  ED course: CODE STROKE. CTs showed "Mild disc osteophyte complexes at C5-C6 and C6-C7 along with moderate degenerative disc disease. Moderate to severe multilevel neural foraminal narrowing." Otherwise negative for acute pathology. Evaluated by neurology. Plan for MRIs, neuro-checks, tele monitoring in CDU.

## 2021-11-06 NOTE — ED CDU PROVIDER INITIAL DAY NOTE - NS ED ROS FT
Constitutional: No fever or chills  Eyes: No visual changes, eye pain   CV: No chest pain or lower extremity edema  Resp: No SOB no cough  GI: No abd pain. No nausea or vomiting. No diarrhea.   : No dysuria, hematuria.   MSK: No musculoskeletal pain  Skin: No rash  Psych: No complaints   Neuro: No headache. + numbness +tingling. No weakness.  Endo: No DM

## 2021-11-06 NOTE — ED PROVIDER NOTE - PHYSICAL EXAMINATION
On Physical Exam:  General: well appearing, in NAD, speaking clearly in full sentences and without difficulty; cooperative with exam  HEENT: PERRL, MMM  Neck: no neck tenderness, no nuchal rigidity  Cardiac: s1s2  Lungs: CTABL  Abdomen: soft nontender/nondistended  : no bladder tenderness or distension  Skin: intact, no rash  Extremities: no peripheral edema, no gross deformities  Neuro: no gross neurologic deficits: CN II-XII grossly intact; 5/5 str in all extremities /elbow/shoulder, hips/knees/ankles; decreased sensation in right palm but able to sense light touch; no other areas of sensation change reported; normal gait; grossly normal coordination with upper and lower extremities.

## 2021-11-06 NOTE — ED PROVIDER NOTE - CLINICAL SUMMARY MEDICAL DECISION MAKING FREE TEXT BOX
Attending note (Obinna): 87y/o M with h/o MGUS, HTN BPH GOUT presenting with RUE numbness which is resolving; no gross focal neurologic deficits noted on exam; CODE STROKE as per hospital protocol given symptoms; CTs and neuro/stroke team eval obtained; checking screening labs and will reassess after initial results of labs/imaging; not candidate for TPA or for neurorad intervention.

## 2021-11-06 NOTE — ED PROVIDER NOTE - CRITICAL CARE ATTENDING CONTRIBUTION TO CARE
Please see above MDM and/or progress notes for medical decision making and the patient's clinical course.

## 2021-11-06 NOTE — ED PROVIDER NOTE - NSICDXPASTMEDICALHX_GEN_ALL_CORE_FT
PAST MEDICAL HISTORY:  Gout     History of BPH     Hypertension     MGUS (monoclonal gammopathy of unknown significance)     Retinal tear 2000 approx    Thrombocytopenia

## 2021-11-06 NOTE — ED ADULT NURSE NOTE - OBJECTIVE STATEMENT
88 y male presents from home with right arm numbness beginning this morning at 0500. Code Stroke called on arrival- see code stroke flow sheet for times. denies weakness, lightheadedness, dizziness, N/V/D, vision change. a&ox3. skin warm and dry. neuro intact- b/l strength and sensation in all extremities. breathing comfortably in bed- no distress. abdomen soft nondistended. safety maintained- bed locked in lowest position. awaiting lab work and dispo.

## 2021-11-06 NOTE — CONSULT NOTE ADULT - SUBJECTIVE AND OBJECTIVE BOX
Neurology Consultation  Shane Vallecillo, PGY-2      HPI: 86 y/o male with PMHx MGUS, mild thrombocytopenia, HTN and BPH      Otherwise denies fever, chills, headaches, vision changes, blurry vision, double vision, nausea, vomiting, hearing change, focal weakness, focal numbness, parasthesias, bowel/ bladder incontinence.      (Stroke only)  NIHSS:   MRS:   ICH:   ABCD2:    PAST MEDICAL & SURGICAL HISTORY:  Hypertension    MGUS (monoclonal gammopathy of unknown significance)    Gout    History of BPH    Retinal tear  2000 approx    Thrombocytopenia    S/P appendectomy  1954    Status post right partial knee replacement  2016    H/O colonoscopy      FAMILY HISTORY:  FH: liver disease (Father, Sibling)    FH: diabetes mellitus (Sibling)    SOCIAL HISTORY: no smoking, alcohol, drug use hx    MEDICATIONS (HOME):  Home Medications:  allopurinol 100 mg oral tablet: 2 tab(s) orally once a day (09 Apr 2021 06:50)  bisoprolol 5 mg oral tablet: 1 tab(s) orally once a day (09 Apr 2021 06:50)  finasteride 5 mg oral tablet: 1 tab(s) orally once a day (09 Apr 2021 06:50)  ramipril 5 mg oral capsule: 1 cap(s) orally once a day (09 Apr 2021 06:50)  Vitamin C 500 mg oral tablet: 1 tab(s) orally once a day (09 Apr 2021 06:50)  Vitamin D3 1000 intl units (25 mcg) oral tablet: 1 tab(s) orally once a day (09 Apr 2021 06:50)    MEDICATIONS  (STANDING):    MEDICATIONS  (PRN):    ALLERGIES/INTOLERANCES:  Allergies  Hayfever (Sneezing)  No Known Drug Allergies    Intolerances    VITALS & EXAMINATION:  Vital Signs Last 24 Hrs  T(C): 36.6 (06 Nov 2021 16:48), Max: 36.6 (06 Nov 2021 16:48)  T(F): 97.9 (06 Nov 2021 16:48), Max: 97.9 (06 Nov 2021 16:48)  HR: 84 (06 Nov 2021 16:48) (84 - 84)  BP: 108/65 (06 Nov 2021 16:48) (108/65 - 108/65)  BP(mean): --  RR: 18 (06 Nov 2021 16:48) (18 - 18)  SpO2: 98% (06 Nov 2021 16:48) (98% - 98%)    General:  Constitutional: Male, appears stated age, in no apparent distress including pain  Head: Normocephalic & atraumatic; Eyes: clear sclera; Neck: supple  Extremities: No cyanosis, clubbing, or edema; Skin: No rashes, bruising, or discoloration.  Resp: breathing comfortably, normal rate    Neurological (>12):  MS: Awake, alert, oriented to person, place, situation, time. Normal affect. Follows all commands. Cooperative.   Language: Speech is clear, fluent, intact with normal tone/rate/ volume and good repetition,  comprehension, registration of words. No perseverance.     CNs: PERRL (R = 3mm, L = 3mm). VFF. EOMI no nystagmus. V1-3 intact to LT, well developed masseter muscles b/l. No facial asymmetry b/l, full eye closure strength b/l. Hearing grossly normal (rubbing fingers) b/l.  Gag reflex deferred.     Motor: Normal muscle bulk & tone. No noticeable tremor or seizure. No pronator drift.              Deltoid	Biceps	Triceps	   R	5	5	5	5		 	  L	5	5	5	5			  	H-Flex	K-Ext	D-Flex	P-Flex  R	5	5	5	5			 	   L	5	5	5	5		     Sensation: Intact to LT/PP/Temp/Vibration/Position b/l., Cortical: Extinction on DSS (neglect): none  Reflexes:              Biceps(C5)       BR(C6)     Triceps(C7)               Patellar(L4)        Plantar Resp  R	2	          2	             2		        2		    	Down   L	2	          2	             2		        2		 	Down     Coordination: No dysmetria to FTN  Gait: Normal Romberg. No postural instability. Normal stance and tandem gait.     LABORATORY:  CBC   Chem       LFTs   Coagulopathy   Lipid Panel   A1c   Cardiac enzymes     U/A   CSF  Other    STUDIES & IMAGING: (EEG, CT, MR, U/S, TTE/HO): Neurology Consultation  Shane Vallecillo, PGY-2      HPI: 88 y/o male with PMHx MGUS, mild thrombocytopenia, HTN and BPH, not on antiplatelet/anticoagulant agent, who presents to ED for RUE numbness. States LKN yesterday 11/5 11PM before bedtime. Woke up 5AM with numbness of entire RUE sleevelike to hands from shoulders. During the day, improved intermittently and reoccurred. During last occurrence, it was limited to R hand. No involvement of face/ lower extremity. Denies headaches, vision changes, blurry vision, double vision, focal weakness, dysarthriia, dysphagia.     (Stroke only)  NIHSS: 0   MRS: 0    ABCD2: 1-2 (intermittent symptom duration and reoccurence)     PAST MEDICAL & SURGICAL HISTORY:  Hypertension    MGUS (monoclonal gammopathy of unknown significance)    Gout    History of BPH    Retinal tear  2000 approx    Thrombocytopenia    S/P appendectomy  1954    Status post right partial knee replacement  2016    H/O colonoscopy    FAMILY HISTORY:  FH: liver disease (Father, Sibling)    FH: diabetes mellitus (Sibling)    SOCIAL HISTORY: no smoking, alcohol, drug use hx    MEDICATIONS (HOME):  Home Medications:  allopurinol 100 mg oral tablet: 2 tab(s) orally once a day (09 Apr 2021 06:50)  bisoprolol 5 mg oral tablet: 1 tab(s) orally once a day (09 Apr 2021 06:50)  finasteride 5 mg oral tablet: 1 tab(s) orally once a day (09 Apr 2021 06:50)  ramipril 5 mg oral capsule: 1 cap(s) orally once a day (09 Apr 2021 06:50)  Vitamin C 500 mg oral tablet: 1 tab(s) orally once a day (09 Apr 2021 06:50)  Vitamin D3 1000 intl units (25 mcg) oral tablet: 1 tab(s) orally once a day (09 Apr 2021 06:50)    MEDICATIONS  (STANDING):    MEDICATIONS  (PRN):    ALLERGIES/INTOLERANCES:  Allergies  Hayfever (Sneezing)  No Known Drug Allergies    Intolerances    VITALS & EXAMINATION:  Vital Signs Last 24 Hrs  T(C): 36.6 (06 Nov 2021 16:48), Max: 36.6 (06 Nov 2021 16:48)  T(F): 97.9 (06 Nov 2021 16:48), Max: 97.9 (06 Nov 2021 16:48)  HR: 84 (06 Nov 2021 16:48) (84 - 84)  BP: 108/65 (06 Nov 2021 16:48) (108/65 - 108/65)  BP(mean): --  RR: 18 (06 Nov 2021 16:48) (18 - 18)  SpO2: 98% (06 Nov 2021 16:48) (98% - 98%)    General:  Constitutional: Male, appears stated age, in no apparent distress including pain  Head: Normocephalic & atraumatic; Eyes: clear sclera; Neck: supple  Extremities: No cyanosis, clubbing,    Resp: breathing comfortably, normal rate    Neurological (>12):  MS: Awake, alert, oriented to person, place, situation, time. Normal affect. Follows all commands. Cooperative.   Language: Speech is clear, fluent, intact with normal tone/rate/ volume and good repetition,  comprehension, registration of words. No perseverance.     CNs: PERRL (R = 3mm, L = 3mm). VFF. EOMI no nystagmus. V1-3 intact to LT, well developed masseter muscles b/l. No facial asymmetry b/l, full eye closure strength b/l. Hearing grossly normal (rubbing fingers) b/l.  Gag reflex deferred.     Motor: Normal muscle bulk & tone. No noticeable tremor or seizure.                Deltoid	Biceps	Triceps	  Wrist ext  Wrist flex  R	5	5	5	5	5	5	  L	5	5	5	5	5	5	  	H-Flex	K-Ext	D-Flex	P-Flex  R	5	5	5	5			 	   L	5	5	5	5		     Sensation: Intact to LT b/l., Cortical: Extinction on DSS (neglect): none  Reflexes:              Biceps(C5)       BR(C6)     Triceps(C7)               Patellar(L4)        Plantar Resp  R	2	          2	             -		        2		    	Down   L	2	          2	             -		        2		 	Down     Coordination: No dysmetria to FTN b/l UE  Gait: Normal stance and gait for age.     LABORATORY:  CBC   Chem       LFTs   Coagulopathy   Lipid Panel   A1c   Cardiac enzymes     U/A   CSF  Other    STUDIES & IMAGING: (EEG, CT, MR, U/S, TTE/HO):    < from: CT Brain Stroke Protocol (11.06.21 @ 17:11) >    EXAM:  CT BRAIN STROKE PROTOCOL                          PROCEDURE DATE:  11/06/2021      INTERPRETATION:  History:  88-year-old with Code stroke, right-sided weakness, now resolved    Technique:  Computed tomography of the head was performed without intravenous contrast. Sagittal and coronal 2-D reformatted images were also obtained. RAPID artificial intelligence was used for preliminary evaluation of intracranial hemorrhage.    Comparison:  None.    Findings:  Brain: No acute intracranial hemorrhage or large vessel infarct is identified; the gray-white junction is intact. No large mass, mass effect, or midline shift is seen. The midline structures are unremarkable. The brain demonstrates unremarkable morphology and volume for age.    Ventricles: No hydrocephalus.  Extra-axial spaces: No subdural or epidural collection. The convexity sulci and basal cisterns are preserved. Moderate dural ossification is noted along the right aspect of the dorsal falx cerebri.  Vascular, intracranial: Minimal intracranial atherosclerotic changes are noted.    Calvarium: The calvarium is intact without focal osseous pathology. No significant scalp contusion identified.  Skull base: The middle ears and mastoid air cells are clear. TMJs are aligned. Right-sided hearing aid device is present.    Orbits/facial bones: Non dedicated views of the orbits are grossly unremarkable. No visualized fracture.  Paranasal sinuses: The visualized paranasal sinuses are well aerated. Moderate leftward deviation the bony nasal septum.    RAPID artificial intelligence reports no evidence of intracranial hemorrhage.    Impression:  Head CT without contrast  1.  No acute intracranial hemorrhage, transcortical infarct, loss of gray-white junction or hyperdense intravascular thrombus.  2.  No extra-axial collection, hydrocephalus, midline shift or space-occupying mass lesion    Dr. CONOR Hameed discussed these findings with Dr. Vallecillo, Neurology on 11/6/2021 5:13 PM, with read back.    --- End of Report ---    BOBBY HAMEED MD; Resident Radiology  This document has been electronically signed.  JOSSY MAGAÑA MD; Attending Radiologist  This document has been electronically signed. Nov 6 2021  5:28PM    < end of copied text >   Neurology Consultation  Shane Vallecillo, PGY-2      HPI: 86 y/o male with PMHx MGUS, mild thrombocytopenia, HTN and BPH, not on antiplatelet/anticoagulant agent, who presents to ED for RUE numbness. States LKN yesterday 11/5 11PM before bedtime. Woke up 5AM with numbness of entire RUE sleevelike to hands from shoulders. During the day, improved intermittently and reoccurred. During last occurrence, it was limited to R hand. No involvement of face/ lower extremity. Denies headaches, vision changes, blurry vision, double vision, focal weakness, dysarthriia, dysphagia.      (Stroke only)  NIHSS: 0   MRS: 0    ABCD2: 1-2 (intermittent symptom duration and reoccurence)     PAST MEDICAL & SURGICAL HISTORY:  Hypertension    MGUS (monoclonal gammopathy of unknown significance)    Gout    History of BPH    Retinal tear  2000 approx    Thrombocytopenia    S/P appendectomy  1954    Status post right partial knee replacement  2016    H/O colonoscopy    FAMILY HISTORY:  FH: liver disease (Father, Sibling)    FH: diabetes mellitus (Sibling)    SOCIAL HISTORY: no smoking, alcohol, drug use hx    MEDICATIONS (HOME):  Home Medications:  allopurinol 100 mg oral tablet: 2 tab(s) orally once a day (09 Apr 2021 06:50)  bisoprolol 5 mg oral tablet: 1 tab(s) orally once a day (09 Apr 2021 06:50)  finasteride 5 mg oral tablet: 1 tab(s) orally once a day (09 Apr 2021 06:50)  ramipril 5 mg oral capsule: 1 cap(s) orally once a day (09 Apr 2021 06:50)  Vitamin C 500 mg oral tablet: 1 tab(s) orally once a day (09 Apr 2021 06:50)  Vitamin D3 1000 intl units (25 mcg) oral tablet: 1 tab(s) orally once a day (09 Apr 2021 06:50)    MEDICATIONS  (STANDING):    MEDICATIONS  (PRN):    ALLERGIES/INTOLERANCES:  Allergies  Hayfever (Sneezing)  No Known Drug Allergies    Intolerances    VITALS & EXAMINATION:  Vital Signs Last 24 Hrs  T(C): 36.6 (06 Nov 2021 16:48), Max: 36.6 (06 Nov 2021 16:48)  T(F): 97.9 (06 Nov 2021 16:48), Max: 97.9 (06 Nov 2021 16:48)  HR: 84 (06 Nov 2021 16:48) (84 - 84)  BP: 108/65 (06 Nov 2021 16:48) (108/65 - 108/65)  BP(mean): --  RR: 18 (06 Nov 2021 16:48) (18 - 18)  SpO2: 98% (06 Nov 2021 16:48) (98% - 98%)    General:  Constitutional: Male, appears stated age, in no apparent distress including pain  Head: Normocephalic & atraumatic; Eyes: clear sclera; Neck: supple  Extremities: No cyanosis, clubbing,    Resp: breathing comfortably, normal rate    Neurological (>12):  MS: Awake, alert, oriented to person, place, situation, time. Normal affect. Follows all commands. Cooperative.   Language: Speech is clear, fluent, intact with normal tone/rate/ volume and good repetition,  comprehension, registration of words. No perseverance.     CNs: PERRL (R = 3mm, L = 3mm). VFF. EOMI no nystagmus. V1-3 intact to LT, well developed masseter muscles b/l. No facial asymmetry b/l, full eye closure strength b/l. Hearing grossly normal (rubbing fingers) b/l.  Gag reflex deferred.     Motor: Normal muscle bulk & tone. No noticeable tremor or seizure.                Deltoid	Biceps	Triceps	  Wrist ext  Wrist flex  R	5	5	5	5	5	5	  L	5	5	5	5	5	5	  	H-Flex	K-Ext	D-Flex	P-Flex  R	5	5	5	5			 	   L	5	5	5	5		     Sensation: Intact to LT b/l., Cortical: Extinction on DSS (neglect): none  Reflexes:              Biceps(C5)       BR(C6)     Triceps(C7)               Patellar(L4)        Plantar Resp  R	2	          2	             -		        2		    	Down   L	2	          2	             -		        2		 	Down     Coordination: No dysmetria to FTN b/l UE  Gait: Normal stance and gait for age.     LABORATORY:  CBC   Chem     LFTs   Coagulopathy   Lipid Panel   A1c   Cardiac enzymes     U/A   CSF  Other    STUDIES & IMAGING: (EEG, CT, MR, U/S, TTE/HO):    < from: CT Brain Stroke Protocol (11.06.21 @ 17:11) >    EXAM:  CT BRAIN STROKE PROTOCOL                          PROCEDURE DATE:  11/06/2021      INTERPRETATION:  History:  88-year-old with Code stroke, right-sided weakness, now resolved    Technique:  Computed tomography of the head was performed without intravenous contrast. Sagittal and coronal 2-D reformatted images were also obtained. RAPID artificial intelligence was used for preliminary evaluation of intracranial hemorrhage.    Comparison:  None.    Findings:  Brain: No acute intracranial hemorrhage or large vessel infarct is identified; the gray-white junction is intact. No large mass, mass effect, or midline shift is seen. The midline structures are unremarkable. The brain demonstrates unremarkable morphology and volume for age.    Ventricles: No hydrocephalus.  Extra-axial spaces: No subdural or epidural collection. The convexity sulci and basal cisterns are preserved. Moderate dural ossification is noted along the right aspect of the dorsal falx cerebri.  Vascular, intracranial: Minimal intracranial atherosclerotic changes are noted.    Calvarium: The calvarium is intact without focal osseous pathology. No significant scalp contusion identified.  Skull base: The middle ears and mastoid air cells are clear. TMJs are aligned. Right-sided hearing aid device is present.    Orbits/facial bones: Non dedicated views of the orbits are grossly unremarkable. No visualized fracture.  Paranasal sinuses: The visualized paranasal sinuses are well aerated. Moderate leftward deviation the bony nasal septum.    RAPID artificial intelligence reports no evidence of intracranial hemorrhage.    Impression:  Head CT without contrast  1.  No acute intracranial hemorrhage, transcortical infarct, loss of gray-white junction or hyperdense intravascular thrombus.  2.  No extra-axial collection, hydrocephalus, midline shift or space-occupying mass lesion    Dr. CONOR Hameed discussed these findings with Dr. Vallecillo, Neurology on 11/6/2021 5:13 PM, with read back.    --- End of Report ---    BOBBY HAMEED MD; Resident Radiology  This document has been electronically signed.  JOSSY MAGAÑA MD; Attending Radiologist  This document has been electronically signed. Nov 6 2021  5:28PM    < end of copied text >

## 2021-11-06 NOTE — ED PROVIDER NOTE - OBJECTIVE STATEMENT
Attending note (Obinna): 89y/o M h/o MGUS HTN, BPH, Gout presenting with numbness in the right arm; from shoulder to hand; states noted numbness upon awakening this morning around 5AM; however, has now significantly improved and just is a little numb in the right palm. No weakness.  No fever/chills. No nausea/vomiting.  Last normal per patient ~11pm (when he went to bed). Denies fall/trauma.  Denies ASA/antiplatelet/anticoauglant use.    [code stroke called from triage; order set placed while I was in gold after communication with mobile charge RN, and then patient met and evaluated concurrently with neuro/stroke team in CT scan, as per hospital protocol]

## 2021-11-06 NOTE — ED ADULT NURSE REASSESSMENT NOTE - NS ED NURSE REASSESS COMMENT FT1
CDU physician to bedside. Plan to move to CDU after repeat troponin results. Fluid bolus completed and labs drawn as per order. Pt sitting comfortably in stretcher and appears to be in no apparent distress.

## 2021-11-06 NOTE — ED PROVIDER NOTE - NS ED ATTENDING STATEMENT MOD
I have personally provided the amount of critical care time documented below excluding time spent on separate procedures.
no

## 2021-11-06 NOTE — STROKE CODE NOTE - IV ALTEPLASE EXCLUSION ABS OTHER
Discussed with stroke fellow Dr Milton Elmore and under supervision with attending Dr Richard Libman regarding decision against candidacy for tPA/ thrombectomy

## 2021-11-06 NOTE — ED ADULT NURSE REASSESSMENT NOTE - NS ED NURSE REASSESS COMMENT FT1
Received pt from JARAD Delarosa , received pt alert and responsive, oriented x4, denies any respiratory distress, SOB, or difficulty breathing. Pt transferred to CDU for intermittent R upper extremity tingling. Pt is currently asymptomatic at his time, pending MRI in AM, pt aware. On telemetry pt is sinus rhythm hr: 70's.  IV in place, patent and free of signs of infiltration, pt denies chest pain or palpitations, V/S stable, pt afebrile, pt denies pain at this time. Pt educated on unit and unit rules, instructed patient to notify RN of any needed assistance, Pt verbalizes understanding, Call bell placed within reach. Safety maintained. Will continue to monitor. Son at bedside. Received pt from JARAD Delarosa , received pt alert and responsive, oriented x4, denies any respiratory distress, SOB, or difficulty breathing. Pt transferred to CDU for intermittent R upper extremity tingling. Pt is currently asymptomatic at his time, pending MRI in AM, pt aware. On telemetry pt is sinus rhythm hr: 70's.  IV in place, patent and free of signs of infiltration, pt denies chest pain or palpitations, V/S stable, pt afebrile, pt denies pain at this time. Pt educated on unit and unit rules, instructed patient to notify RN of any needed assistance, Pt verbalizes understanding, Call bell placed within reach. Safety maintained. Will continue to monitor. Son at bedside. Pt family member to bring home medications for patient to take.

## 2021-11-07 VITALS
SYSTOLIC BLOOD PRESSURE: 143 MMHG | HEART RATE: 71 BPM | DIASTOLIC BLOOD PRESSURE: 66 MMHG | RESPIRATION RATE: 20 BRPM | OXYGEN SATURATION: 99 % | TEMPERATURE: 98 F

## 2021-11-07 LAB
A1C WITH ESTIMATED AVERAGE GLUCOSE RESULT: 6.2 % — HIGH (ref 4–5.6)
CHOLEST SERPL-MCNC: 148 MG/DL — SIGNIFICANT CHANGE UP
ESTIMATED AVERAGE GLUCOSE: 131 MG/DL — HIGH (ref 68–114)
HDLC SERPL-MCNC: 56 MG/DL — SIGNIFICANT CHANGE UP
LIPID PNL WITH DIRECT LDL SERPL: 76 MG/DL — SIGNIFICANT CHANGE UP
NON HDL CHOLESTEROL: 92 MG/DL — SIGNIFICANT CHANGE UP
TRIGL SERPL-MCNC: 78 MG/DL — SIGNIFICANT CHANGE UP

## 2021-11-07 PROCEDURE — 83605 ASSAY OF LACTIC ACID: CPT

## 2021-11-07 PROCEDURE — 99291 CRITICAL CARE FIRST HOUR: CPT

## 2021-11-07 PROCEDURE — 82947 ASSAY GLUCOSE BLOOD QUANT: CPT

## 2021-11-07 PROCEDURE — 82803 BLOOD GASES ANY COMBINATION: CPT

## 2021-11-07 PROCEDURE — 84132 ASSAY OF SERUM POTASSIUM: CPT

## 2021-11-07 PROCEDURE — 83036 HEMOGLOBIN GLYCOSYLATED A1C: CPT

## 2021-11-07 PROCEDURE — 96360 HYDRATION IV INFUSION INIT: CPT | Mod: XU

## 2021-11-07 PROCEDURE — 85730 THROMBOPLASTIN TIME PARTIAL: CPT

## 2021-11-07 PROCEDURE — 70496 CT ANGIOGRAPHY HEAD: CPT | Mod: MA

## 2021-11-07 PROCEDURE — 80061 LIPID PANEL: CPT

## 2021-11-07 PROCEDURE — 85610 PROTHROMBIN TIME: CPT

## 2021-11-07 PROCEDURE — 82962 GLUCOSE BLOOD TEST: CPT

## 2021-11-07 PROCEDURE — 70551 MRI BRAIN STEM W/O DYE: CPT | Mod: 26,MA

## 2021-11-07 PROCEDURE — 82435 ASSAY OF BLOOD CHLORIDE: CPT

## 2021-11-07 PROCEDURE — 36415 COLL VENOUS BLD VENIPUNCTURE: CPT

## 2021-11-07 PROCEDURE — 84484 ASSAY OF TROPONIN QUANT: CPT

## 2021-11-07 PROCEDURE — 72156 MRI NECK SPINE W/O & W/DYE: CPT | Mod: MA

## 2021-11-07 PROCEDURE — G0378: CPT

## 2021-11-07 PROCEDURE — A9585: CPT

## 2021-11-07 PROCEDURE — 84295 ASSAY OF SERUM SODIUM: CPT

## 2021-11-07 PROCEDURE — 85014 HEMATOCRIT: CPT

## 2021-11-07 PROCEDURE — 70551 MRI BRAIN STEM W/O DYE: CPT | Mod: MA

## 2021-11-07 PROCEDURE — 72156 MRI NECK SPINE W/O & W/DYE: CPT | Mod: 26,MA

## 2021-11-07 PROCEDURE — 96361 HYDRATE IV INFUSION ADD-ON: CPT | Mod: XU

## 2021-11-07 PROCEDURE — 85025 COMPLETE CBC W/AUTO DIFF WBC: CPT

## 2021-11-07 PROCEDURE — 93005 ELECTROCARDIOGRAM TRACING: CPT

## 2021-11-07 PROCEDURE — U0005: CPT

## 2021-11-07 PROCEDURE — 80053 COMPREHEN METABOLIC PANEL: CPT

## 2021-11-07 PROCEDURE — 85018 HEMOGLOBIN: CPT

## 2021-11-07 PROCEDURE — 70450 CT HEAD/BRAIN W/O DYE: CPT | Mod: MA

## 2021-11-07 PROCEDURE — U0003: CPT

## 2021-11-07 PROCEDURE — 70498 CT ANGIOGRAPHY NECK: CPT | Mod: MA

## 2021-11-07 PROCEDURE — 82330 ASSAY OF CALCIUM: CPT

## 2021-11-07 NOTE — ED ADULT NURSE REASSESSMENT NOTE - NS ED NURSE REASSESS COMMENT FT1
11 am   Pt is evaluated by CDU MD Tyler Ann  pt is feeling better.  Pt is discharged . Ml out  CASS Hankins   explained the follow up care & gave the discharge summary  . Pt has stable vitals steady gait A&OX 4 at the time of Discharge

## 2021-11-07 NOTE — ED ADULT NURSE REASSESSMENT NOTE - NS ED NURSE REASSESS COMMENT FT1
07.00 Am Received the Pt from  JARAD Oneal. Pt is Observed for RT arm numbness   for MRI  Received the Pt A&OX 4 obeys commands Shweta N/V/D fever chills cp SOB   Comfort care & safety measures continued  IV site looks clean & dry no signs of infiltration noted pt denies  pain IV site .  Pt is advised to call for help  call bell with in the reach pt verbalized the understanding . Pt states  his symptoms are improving pending CDU  MD milian . GCS 15/15 A&OX 4 PERRLA  size 3 Strong upper & lower extremities steady gait  Pt is Hard of hearing   No facial droop  No Hand Leg drop  Continue to monitor

## 2021-11-07 NOTE — ED CDU PROVIDER DISPOSITION NOTE - CLINICAL COURSE
87y/o M h/o MGUS HTN, BPH, Gout presenting with numbness in the right arm; from shoulder to hand; states noted numbness upon awakening this morning around 5AM; however, has now significantly improved and just is a little tingling in the right palm. No weakness.  No fever/chills. No nausea/vomiting. Denies chest pain, SOB. Denies fall/trauma.  Denies ASA/antiplatelet/ anticoagulant use.  ED course: CODE STROKE. CTs showed "Mild disc osteophyte complexes at C5-C6 and C6-C7 along with moderate degenerative disc disease. Moderate to severe multilevel neural foraminal narrowing." Otherwise negative for acute pathology. Evaluated by neurology. Plan for MRIs, neuro-checks, tele monitoring in CDU. 89y/o M h/o MGUS HTN, BPH, Gout presenting with numbness in the right arm; from shoulder to hand; states noted numbness upon awakening this morning around 5AM; however, has now significantly improved and just is a little tingling in the right palm. No weakness.  No fever/chills. No nausea/vomiting. Denies chest pain, SOB. Denies fall/trauma.  Denies ASA/antiplatelet/ anticoagulant use.  ED course: CODE STROKE. CTs showed "Mild disc osteophyte complexes at C5-C6 and C6-C7 along with moderate degenerative disc disease. Moderate to severe multilevel neural foraminal narrowing." Otherwise negative for acute pathology. Evaluated by neurology. Plan for MRIs, neuro-checks, tele monitoring in CDU. Seen by neuro attending Dr. Jolly who recommended outpt f/u. provided spine f/u. cleared for discharge home per ED attending Dr. Ann

## 2021-11-07 NOTE — ED CDU PROVIDER DISPOSITION NOTE - CARE PROVIDERS DIRECT ADDRESSES
,[email or web address]_Unknown,[email or web address]

## 2021-11-07 NOTE — ED CDU PROVIDER SUBSEQUENT DAY NOTE - PROGRESS NOTE DETAILS
CASS Hankins: Patient seen at bedside in NAD.  VSS.  Patient resting comfortably but did report RUE numbness that is intermittent and last episode was approx 6am. seen by neuro stroke attending Dr. Moran and will determine need for antiplatelet after MRI read CASS Hankins: reviewed MRI with neurology Dr. Jolly who recommended outpt f/u

## 2021-11-07 NOTE — ED CDU PROVIDER DISPOSITION NOTE - NSFOLLOWUPINSTRUCTIONS_ED_ALL_ED_FT
Hydrate.     You may be contacted by our Emergency Department Referrals Coordinator to set up your follow up appointment within 24-48 hours of your discharge Monday- Friday. We recommend you follow up with your primary care provider within the next 2-3 days, please bring all of your results with you.     Please return to the Emergency Department with new, worsening, or concerning symptoms, such as:  -Shortness of breath or trouble breathing  -Pressure, pain, tightness in chest  -Facial drooping, arm weakness, or speech difficulty   -Head injury or loss of consciousness   -Nonstop bleeding or an open wound     *More detailed information regarding your visit and discharge can be found by reviewing this packet Hydrate.     You may be contacted by our Emergency Department Referrals Coordinator to set up your follow up appointment within 24-48 hours of your discharge Monday- Friday. We recommend you follow up with your primary care provider within the next 2-3 days, please bring all of your results with you.     Please return to the Emergency Department with new, worsening, or concerning symptoms, such as:  -Shortness of breath or trouble breathing  -Pressure, pain, tightness in chest  -Facial drooping, arm weakness, or speech difficulty   -Head injury or loss of consciousness   -Nonstop bleeding or an open wound     follow up with neurology Dr. Jolly within 1 week of discharge  [address] [facility] [address], [address]  [place] [address]  Phone: [phone]    Follow up with spine Doctor Dr. Andrews regarding your cervical disc issues   [address], [address]  [place] [address]  Phone: [phone]

## 2021-11-07 NOTE — ED CDU PROVIDER DISPOSITION NOTE - CARE PROVIDER_API CALL
Calin Jolly)  Neurology; Vascular Neurology  [address], [address]  [place] [address]  Phone: [phone]  Fax: [phone]  Follow Up Time: 4-6 Days    Nathan Andrews)  Neurosurgery  General  [address] [facility], [address]  [place] [address]  Phone: [phone]  Fax: [phone]  Follow Up Time: 4-6 Days

## 2021-11-07 NOTE — ED CDU PROVIDER SUBSEQUENT DAY NOTE - HISTORY
No interval changes since initial CDU provider note. Pt feels well without complaint. NAD VSS. no events on tele. Plan for MRIs in the setting of RUE numbness, improving. Neuro following.  - CASS Echavarria

## 2021-11-07 NOTE — ED CDU PROVIDER DISPOSITION NOTE - ATTENDING CONTRIBUTION TO CARE
Patient seen and evaluated at bedside.  CT/labs reviewed.  Neuro recs appreciated.  Currently without any symptoms -- specifically no LUE paresthesias, weakness, slurred speech, dizziness.  Nontoxic appearing, normal gait.  Still pending MRI.  Plan to d/c c neuro f/u presuming no actionable findings on imaging.  Will closely monitor in the meantime.  --BMM

## 2021-11-07 NOTE — ED CDU PROVIDER DISPOSITION NOTE - PROVIDER TOKENS
PROVIDER:[TOKEN:[[id number]],FOLLOWUP:[4-6 Days]],PROVIDER:[TOKEN:[[id number]],FOLLOWUP:[4-6 Days]]

## 2021-11-10 ENCOUNTER — TRANSCRIPTION ENCOUNTER (OUTPATIENT)
Age: 86
End: 2021-11-10

## 2022-02-03 NOTE — ED CDU PROVIDER DISPOSITION NOTE - CARE PLAN
1 Detail Level: Simple Render Risk Assessment In Note?: no Comment: Proven by biopsy 5/27/21. C&D site healing well

## 2022-04-25 NOTE — ED ADULT NURSE NOTE - SUICIDE SCREENING QUESTION 3
Body Location Override (Optional): right inferior eyelid/right central zygoma/right cheek Detail Level: Detailed Add 69341 Cpt? (Important Note: In 2017 The Use Of 12694 Is Being Tracked By Cms To Determine Future Global Period Reimbursement For Global Periods): no Wound Diameter In Cm(Optional): 0 No

## 2022-06-25 ENCOUNTER — RX RENEWAL (OUTPATIENT)
Age: 87
End: 2022-06-25

## 2022-11-20 ENCOUNTER — NON-APPOINTMENT (OUTPATIENT)
Age: 87
End: 2022-11-20

## 2023-04-03 ENCOUNTER — RX RENEWAL (OUTPATIENT)
Age: 88
End: 2023-04-03

## 2023-05-03 ENCOUNTER — NON-APPOINTMENT (OUTPATIENT)
Age: 88
End: 2023-05-03

## 2023-05-10 ENCOUNTER — APPOINTMENT (OUTPATIENT)
Dept: RADIOLOGY | Facility: IMAGING CENTER | Age: 88
End: 2023-05-10
Payer: MEDICARE

## 2023-05-10 ENCOUNTER — OUTPATIENT (OUTPATIENT)
Dept: OUTPATIENT SERVICES | Facility: HOSPITAL | Age: 88
LOS: 1 days | End: 2023-05-10
Payer: MEDICARE

## 2023-05-10 DIAGNOSIS — Z90.49 ACQUIRED ABSENCE OF OTHER SPECIFIED PARTS OF DIGESTIVE TRACT: Chronic | ICD-10-CM

## 2023-05-10 DIAGNOSIS — Z00.8 ENCOUNTER FOR OTHER GENERAL EXAMINATION: ICD-10-CM

## 2023-05-10 DIAGNOSIS — Z98.890 OTHER SPECIFIED POSTPROCEDURAL STATES: Chronic | ICD-10-CM

## 2023-05-10 DIAGNOSIS — Z96.651 PRESENCE OF RIGHT ARTIFICIAL KNEE JOINT: Chronic | ICD-10-CM

## 2023-05-10 PROCEDURE — 71046 X-RAY EXAM CHEST 2 VIEWS: CPT

## 2023-05-10 PROCEDURE — 71046 X-RAY EXAM CHEST 2 VIEWS: CPT | Mod: 26

## 2023-08-22 ENCOUNTER — APPOINTMENT (OUTPATIENT)
Dept: DERMATOLOGY | Facility: CLINIC | Age: 88
End: 2023-08-22

## 2024-02-02 ENCOUNTER — NON-APPOINTMENT (OUTPATIENT)
Age: 89
End: 2024-02-02

## 2024-02-22 ENCOUNTER — NON-APPOINTMENT (OUTPATIENT)
Age: 89
End: 2024-02-22

## 2024-05-13 ENCOUNTER — RX RENEWAL (OUTPATIENT)
Age: 89
End: 2024-05-13

## 2024-09-20 ENCOUNTER — APPOINTMENT (OUTPATIENT)
Dept: CARDIOLOGY | Facility: CLINIC | Age: 89
End: 2024-09-20
Payer: MEDICARE

## 2024-09-20 ENCOUNTER — NON-APPOINTMENT (OUTPATIENT)
Age: 89
End: 2024-09-20

## 2024-09-20 VITALS
HEART RATE: 66 BPM | HEIGHT: 66 IN | DIASTOLIC BLOOD PRESSURE: 70 MMHG | BODY MASS INDEX: 28.45 KG/M2 | WEIGHT: 177 LBS | SYSTOLIC BLOOD PRESSURE: 130 MMHG

## 2024-09-20 DIAGNOSIS — I10 ESSENTIAL (PRIMARY) HYPERTENSION: ICD-10-CM

## 2024-09-20 DIAGNOSIS — Z13.6 ENCOUNTER FOR SCREENING FOR CARDIOVASCULAR DISORDERS: ICD-10-CM

## 2024-09-20 PROCEDURE — G2211 COMPLEX E/M VISIT ADD ON: CPT

## 2024-09-20 PROCEDURE — 93000 ELECTROCARDIOGRAM COMPLETE: CPT

## 2024-09-20 PROCEDURE — 99204 OFFICE O/P NEW MOD 45 MIN: CPT

## 2024-09-23 PROBLEM — I10 BENIGN ESSENTIAL HYPERTENSION: Status: ACTIVE | Noted: 2024-09-23

## 2024-09-23 PROBLEM — Z13.6 SCREENING FOR CARDIOVASCULAR CONDITION: Status: ACTIVE | Noted: 2024-09-23

## 2024-10-30 ENCOUNTER — APPOINTMENT (OUTPATIENT)
Dept: CARDIOLOGY | Facility: CLINIC | Age: 89
End: 2024-10-30
Payer: MEDICARE

## 2024-10-30 PROCEDURE — 93306 TTE W/DOPPLER COMPLETE: CPT

## 2024-10-30 PROCEDURE — 93880 EXTRACRANIAL BILAT STUDY: CPT

## 2024-11-06 PROCEDURE — 93880 EXTRACRANIAL BILAT STUDY: CPT

## 2025-01-27 NOTE — ED CDU PROVIDER DISPOSITION NOTE - PATIENT PORTAL LINK FT
You can access the FollowMyHealth Patient Portal offered by [facility] by registering at the following website: [email or web address]. By joining [facility]’s FollowMyHealth portal, you will also be able to view your health information using other applications (apps) compatible with our system.

## 2025-03-20 ENCOUNTER — RX RENEWAL (OUTPATIENT)
Age: 89
End: 2025-03-20

## 2025-04-24 ENCOUNTER — NON-APPOINTMENT (OUTPATIENT)
Age: 89
End: 2025-04-24

## 2025-04-24 ENCOUNTER — APPOINTMENT (OUTPATIENT)
Dept: ORTHOPEDIC SURGERY | Facility: CLINIC | Age: 89
End: 2025-04-24
Payer: MEDICARE

## 2025-04-24 VITALS — BODY MASS INDEX: 28.56 KG/M2 | HEIGHT: 67 IN | WEIGHT: 182 LBS

## 2025-04-24 DIAGNOSIS — M17.0 BILATERAL PRIMARY OSTEOARTHRITIS OF KNEE: ICD-10-CM

## 2025-04-24 PROCEDURE — 99204 OFFICE O/P NEW MOD 45 MIN: CPT

## 2025-04-24 PROCEDURE — 73564 X-RAY EXAM KNEE 4 OR MORE: CPT | Mod: 50

## 2025-04-24 RX ORDER — MELOXICAM 15 MG/1
15 TABLET ORAL
Qty: 30 | Refills: 3 | Status: ACTIVE | COMMUNITY
Start: 2025-04-24 | End: 1900-01-01

## 2025-08-25 ENCOUNTER — NON-APPOINTMENT (OUTPATIENT)
Age: 89
End: 2025-08-25

## 2025-08-27 ENCOUNTER — APPOINTMENT (OUTPATIENT)
Dept: INTERNAL MEDICINE | Facility: CLINIC | Age: 89
End: 2025-08-27
Payer: MEDICARE

## 2025-08-27 ENCOUNTER — LABORATORY RESULT (OUTPATIENT)
Age: 89
End: 2025-08-27

## 2025-08-27 VITALS
HEIGHT: 66.5 IN | BODY MASS INDEX: 29.86 KG/M2 | SYSTOLIC BLOOD PRESSURE: 132 MMHG | OXYGEN SATURATION: 98 % | HEART RATE: 65 BPM | WEIGHT: 188 LBS | TEMPERATURE: 97.8 F | DIASTOLIC BLOOD PRESSURE: 71 MMHG

## 2025-08-27 DIAGNOSIS — F32.A DEPRESSION, UNSPECIFIED: ICD-10-CM

## 2025-08-27 DIAGNOSIS — D47.2 MONOCLONAL GAMMOPATHY: ICD-10-CM

## 2025-08-27 DIAGNOSIS — I10 ESSENTIAL (PRIMARY) HYPERTENSION: ICD-10-CM

## 2025-08-27 DIAGNOSIS — M10.9 GOUT, UNSPECIFIED: ICD-10-CM

## 2025-08-27 LAB
25(OH)D3 SERPL-MCNC: 36.6 NG/ML
ALBUMIN SERPL ELPH-MCNC: 4.3 G/DL
ALP BLD-CCNC: 104 U/L
ALT SERPL-CCNC: 37 U/L
ANION GAP SERPL CALC-SCNC: 15 MMOL/L
AST SERPL-CCNC: 39 U/L
BILIRUB SERPL-MCNC: 1.8 MG/DL
BUN SERPL-MCNC: 10 MG/DL
CALCIUM SERPL-MCNC: 10.1 MG/DL
CHLORIDE SERPL-SCNC: 99 MMOL/L
CHOLEST SERPL-MCNC: 142 MG/DL
CO2 SERPL-SCNC: 24 MMOL/L
CREAT SERPL-MCNC: 0.64 MG/DL
CRP SERPL HS-MCNC: 0.29 MG/L
EGFRCR SERPLBLD CKD-EPI 2021: 89 ML/MIN/1.73M2
FERRITIN SERPL-MCNC: 133 NG/ML
GLUCOSE SERPL-MCNC: 87 MG/DL
HDLC SERPL-MCNC: 65 MG/DL
IRON SATN MFR SERPL: 19 %
IRON SERPL-MCNC: 66 UG/DL
LDH SERPL-CCNC: 171 U/L
LDLC SERPL-MCNC: 62 MG/DL
NONHDLC SERPL-MCNC: 76 MG/DL
PHOSPHATE SERPL-MCNC: 3.1 MG/DL
POTASSIUM SERPL-SCNC: 4.5 MMOL/L
PROT SERPL-MCNC: 6.4 G/DL
PSA SERPL-MCNC: 2.79 NG/ML
SODIUM SERPL-SCNC: 137 MMOL/L
TIBC SERPL-MCNC: 342 UG/DL
TRIGL SERPL-MCNC: 69 MG/DL
TSH SERPL-ACNC: 1.18 UIU/ML
UIBC SERPL-MCNC: 276 UG/DL
URATE SERPL-MCNC: 5 MG/DL

## 2025-08-27 PROCEDURE — 82270 OCCULT BLOOD FECES: CPT

## 2025-08-27 PROCEDURE — G0444 DEPRESSION SCREEN ANNUAL: CPT | Mod: 59

## 2025-08-27 PROCEDURE — 93000 ELECTROCARDIOGRAM COMPLETE: CPT | Mod: 59

## 2025-08-27 PROCEDURE — 36415 COLL VENOUS BLD VENIPUNCTURE: CPT

## 2025-08-27 PROCEDURE — G0439: CPT

## 2025-08-27 PROCEDURE — G0136: CPT | Mod: 59

## 2025-08-27 RX ORDER — ALLOPURINOL 200 MG/1
200 TABLET ORAL
Refills: 0 | Status: ACTIVE | COMMUNITY
Start: 2025-08-27

## 2025-08-27 RX ORDER — RAMIPRIL 5 MG/1
5 CAPSULE ORAL DAILY
Qty: 30 | Refills: 0 | Status: ACTIVE | COMMUNITY
Start: 2025-08-27

## 2025-08-28 LAB
APPEARANCE: CLEAR
BASOPHILS # BLD AUTO: 0.02 K/UL
BASOPHILS NFR BLD AUTO: 0.4 %
BILIRUBIN URINE: NEGATIVE
BLOOD URINE: NEGATIVE
COLOR: YELLOW
DEPRECATED KAPPA LC FREE/LAMBDA SER: 25.63 RATIO
EOSINOPHIL # BLD AUTO: 0.04 K/UL
EOSINOPHIL NFR BLD AUTO: 0.7 %
ERYTHROCYTE [SEDIMENTATION RATE] IN BLOOD BY WESTERGREN METHOD: 12 MM/HR
GLUCOSE QUALITATIVE U: NEGATIVE MG/DL
HCT VFR BLD CALC: 41.5 %
HGB BLD-MCNC: 14.1 G/DL
IMM GRANULOCYTES NFR BLD AUTO: 0.2 %
KAPPA LC CSF-MCNC: 0.84 MG/DL
KAPPA LC SERPL-MCNC: 21.53 MG/DL
KETONES URINE: NEGATIVE MG/DL
LEUKOCYTE ESTERASE URINE: ABNORMAL
LYMPHOCYTES # BLD AUTO: 1.94 K/UL
LYMPHOCYTES NFR BLD AUTO: 35.7 %
MAN DIFF?: NORMAL
MCHC RBC-ENTMCNC: 33.2 PG
MCHC RBC-ENTMCNC: 34 G/DL
MCV RBC AUTO: 97.6 FL
MONOCYTES # BLD AUTO: 0.48 K/UL
MONOCYTES NFR BLD AUTO: 8.8 %
NEUTROPHILS # BLD AUTO: 2.95 K/UL
NEUTROPHILS NFR BLD AUTO: 54.2 %
NITRITE URINE: NEGATIVE
PH URINE: 7.5
PLATELET # BLD AUTO: 111 K/UL
PROTEIN URINE: NORMAL MG/DL
RBC # BLD: 4.25 M/UL
RBC # FLD: 12.8 %
SPECIFIC GRAVITY URINE: 1.02
UROBILINOGEN URINE: 1 MG/DL
WBC # FLD AUTO: 5.44 K/UL

## 2025-09-02 LAB — M PROTEIN SPEC IFE-MCNC: NORMAL

## 2025-09-04 ENCOUNTER — NON-APPOINTMENT (OUTPATIENT)
Age: 89
End: 2025-09-04